# Patient Record
Sex: FEMALE | Race: WHITE | NOT HISPANIC OR LATINO | Employment: FULL TIME | URBAN - METROPOLITAN AREA
[De-identification: names, ages, dates, MRNs, and addresses within clinical notes are randomized per-mention and may not be internally consistent; named-entity substitution may affect disease eponyms.]

---

## 2019-05-20 ENCOUNTER — TELEPHONE (OUTPATIENT)
Dept: OBSTETRICS AND GYNECOLOGY | Facility: CLINIC | Age: 24
End: 2019-05-20

## 2019-05-20 NOTE — TELEPHONE ENCOUNTER
"Patient called and wanted to schedule an appointment in [place]. Asked patient is she is currently pregnant. Patient states "no." Let her know that at this time we do not have a provider that goes to [place]. Offered appointments at the [facility]. Patient declined and states that she will call the [facility]. Call ended.   "

## 2019-05-20 NOTE — TELEPHONE ENCOUNTER
----- Message from Yudith Whatley sent at 5/20/2019  9:51 AM CDT -----  Contact: pt  Pt needs to schedule appt. ... .[phone] (home)

## 2019-08-02 PROBLEM — R87.612 PAP SMEAR ABNORMALITY OF CERVIX WITH LGSIL: Status: ACTIVE | Noted: 2019-08-02

## 2019-08-02 PROBLEM — N87.1 MODERATE DYSPLASIA OF CERVIX (CIN II): Status: ACTIVE | Noted: 2019-08-02

## 2022-11-11 PROBLEM — Z3A.31 31 WEEKS GESTATION OF PREGNANCY: Status: ACTIVE | Noted: 2022-11-11

## 2022-11-11 PROBLEM — O26.893 HEADACHE IN PREGNANCY, ANTEPARTUM, THIRD TRIMESTER: Status: ACTIVE | Noted: 2022-11-11

## 2022-11-11 PROBLEM — R51.9 HEADACHE IN PREGNANCY, ANTEPARTUM, THIRD TRIMESTER: Status: ACTIVE | Noted: 2022-11-11

## 2022-12-30 PROBLEM — O36.5990 INTRAUTERINE GROWTH RESTRICTION, ANTEPARTUM: Status: ACTIVE | Noted: 2022-12-30

## 2023-10-23 PROBLEM — Z3A.31 31 WEEKS GESTATION OF PREGNANCY: Status: RESOLVED | Noted: 2022-11-11 | Resolved: 2023-10-23
